# Patient Record
Sex: FEMALE | Race: WHITE | URBAN - METROPOLITAN AREA
[De-identification: names, ages, dates, MRNs, and addresses within clinical notes are randomized per-mention and may not be internally consistent; named-entity substitution may affect disease eponyms.]

---

## 2020-05-18 ENCOUNTER — APPOINTMENT (OUTPATIENT)
Dept: GENERAL RADIOLOGY | Age: 70
End: 2020-05-18
Attending: EMERGENCY MEDICINE
Payer: COMMERCIAL

## 2020-05-18 ENCOUNTER — HOSPITAL ENCOUNTER (EMERGENCY)
Age: 70
Discharge: HOME | End: 2020-05-18
Attending: EMERGENCY MEDICINE
Payer: COMMERCIAL

## 2020-05-18 VITALS
TEMPERATURE: 98.2 F | RESPIRATION RATE: 16 BRPM | WEIGHT: 207.89 LBS | OXYGEN SATURATION: 97 % | HEART RATE: 85 BPM | SYSTOLIC BLOOD PRESSURE: 133 MMHG | DIASTOLIC BLOOD PRESSURE: 57 MMHG

## 2020-05-18 DIAGNOSIS — S43.014A ANTERIOR DISLOCATION OF RIGHT SHOULDER, INITIAL ENCOUNTER: Primary | ICD-10-CM

## 2020-05-18 PROBLEM — S42.91XA TRAUMATIC CLOSED DISPLACED FRACTURE OF RIGHT SHOULDER WITH ANTERIOR DISLOCATION: Status: ACTIVE | Noted: 2020-05-18

## 2020-05-18 PROCEDURE — 74011250636 HC RX REV CODE- 250/636

## 2020-05-18 PROCEDURE — 74011250636 HC RX REV CODE- 250/636: Performed by: EMERGENCY MEDICINE

## 2020-05-18 PROCEDURE — 99152 MOD SED SAME PHYS/QHP 5/>YRS: CPT

## 2020-05-18 PROCEDURE — 96374 THER/PROPH/DIAG INJ IV PUSH: CPT

## 2020-05-18 PROCEDURE — 74011250636 HC RX REV CODE- 250/636: Performed by: PHYSICIAN ASSISTANT

## 2020-05-18 PROCEDURE — 99285 EMERGENCY DEPT VISIT HI MDM: CPT

## 2020-05-18 PROCEDURE — 96375 TX/PRO/DX INJ NEW DRUG ADDON: CPT

## 2020-05-18 PROCEDURE — 73030 X-RAY EXAM OF SHOULDER: CPT

## 2020-05-18 PROCEDURE — 75810000303 HC CLSD TRMT  FRACTURE/DISLOCATION W/  ANES

## 2020-05-18 RX ORDER — AMLODIPINE BESYLATE 10 MG/1
10 TABLET ORAL DAILY
COMMUNITY

## 2020-05-18 RX ORDER — ONDANSETRON 2 MG/ML
4 INJECTION INTRAMUSCULAR; INTRAVENOUS ONCE
Status: COMPLETED | OUTPATIENT
Start: 2020-05-18 | End: 2020-05-18

## 2020-05-18 RX ORDER — IBUPROFEN 600 MG/1
600 TABLET ORAL
Qty: 20 TAB | Refills: 0 | Status: SHIPPED | OUTPATIENT
Start: 2020-05-18

## 2020-05-18 RX ORDER — FENTANYL CITRATE 50 UG/ML
50 INJECTION, SOLUTION INTRAMUSCULAR; INTRAVENOUS
Status: DISCONTINUED | OUTPATIENT
Start: 2020-05-18 | End: 2020-05-18

## 2020-05-18 RX ORDER — MORPHINE SULFATE 2 MG/ML
6 INJECTION, SOLUTION INTRAMUSCULAR; INTRAVENOUS
Status: DISCONTINUED | OUTPATIENT
Start: 2020-05-18 | End: 2020-05-18

## 2020-05-18 RX ORDER — PROPOFOL 10 MG/ML
100 INJECTION, EMULSION INTRAVENOUS
Status: COMPLETED | OUTPATIENT
Start: 2020-05-18 | End: 2020-05-18

## 2020-05-18 RX ORDER — FENTANYL CITRATE 50 UG/ML
INJECTION, SOLUTION INTRAMUSCULAR; INTRAVENOUS
Status: COMPLETED
Start: 2020-05-18 | End: 2020-05-18

## 2020-05-18 RX ORDER — METOPROLOL TARTRATE 25 MG/1
12.5 TABLET, FILM COATED ORAL DAILY
COMMUNITY

## 2020-05-18 RX ORDER — HYDROCODONE BITARTRATE AND ACETAMINOPHEN 5; 325 MG/1; MG/1
1 TABLET ORAL
Qty: 15 TAB | Refills: 0 | Status: SHIPPED | OUTPATIENT
Start: 2020-05-18 | End: 2020-05-23

## 2020-05-18 RX ORDER — LISINOPRIL 40 MG/1
40 TABLET ORAL DAILY
COMMUNITY

## 2020-05-18 RX ORDER — MORPHINE SULFATE 2 MG/ML
4 INJECTION, SOLUTION INTRAMUSCULAR; INTRAVENOUS
Status: COMPLETED | OUTPATIENT
Start: 2020-05-18 | End: 2020-05-18

## 2020-05-18 RX ORDER — FENTANYL CITRATE 50 UG/ML
100 INJECTION, SOLUTION INTRAMUSCULAR; INTRAVENOUS
Status: COMPLETED | OUTPATIENT
Start: 2020-05-18 | End: 2020-05-18

## 2020-05-18 RX ADMIN — ONDANSETRON 4 MG: 2 INJECTION INTRAMUSCULAR; INTRAVENOUS at 16:25

## 2020-05-18 RX ADMIN — FENTANYL CITRATE 100 MCG: 50 INJECTION, SOLUTION INTRAMUSCULAR; INTRAVENOUS at 16:37

## 2020-05-18 RX ADMIN — FENTANYL CITRATE 100 MCG: 50 INJECTION INTRAMUSCULAR; INTRAVENOUS at 16:37

## 2020-05-18 RX ADMIN — PROPOFOL 200 MG: 10 INJECTION, EMULSION INTRAVENOUS at 16:30

## 2020-05-18 RX ADMIN — MORPHINE SULFATE 4 MG: 2 INJECTION, SOLUTION INTRAMUSCULAR; INTRAVENOUS at 16:16

## 2020-05-18 NOTE — CONSULTS
ORTHOPAEDIC CONSULT NOTE    Subjective:     Date of Consultation:  May 18, 2020      Clyde Agosto is a 81 y.o. female who is being seen for right shoulder dislocation. Pt reports GLF 6 days ago. Was seen at [facility] this afternoon and referred to ED for reduction. Pt. Is right hand dominant. Denies prior right shoulder issues. Denies numbness or paraesthesia of the UE     Patient Active Problem List    Diagnosis Date Noted    Traumatic closed displaced fracture of right shoulder with anterior dislocation 05/18/2020     History reviewed. No pertinent family history. Social History     Tobacco Use    Smoking status: Former Smoker    Smokeless tobacco: Never Used   Substance Use Topics    Alcohol use: Yes     Past Medical History:   Diagnosis Date    Hypertension       Past Surgical History:   Procedure Laterality Date    HX HYSTERECTOMY        Prior to Admission medications    Medication Sig Start Date End Date Taking? Authorizing Provider   lisinopriL (PRINIVIL, ZESTRIL) 40 mg tablet Take 40 mg by mouth daily. Yes Other, MD Danie   metoprolol tartrate (LOPRESSOR) 25 mg tablet Take 12.5 mg by mouth daily. Yes Other, MD Danie   amLODIPine (NORVASC) 10 mg tablet Take 10 mg by mouth daily. Yes Other, MD Danie   HYDROcodone-acetaminophen (Norco) 5-325 mg per tablet Take 1 Tab by mouth every six (6) hours as needed for Pain for up to 5 days. Max Daily Amount: 4 Tabs. 5/18/20 5/23/20 Yes Son Tse MD   ibuprofen (MOTRIN) 600 mg tablet Take 1 Tab by mouth every six (6) hours as needed for Pain. 5/18/20  Yes Son Tse MD     No current facility-administered medications for this encounter. Current Outpatient Medications   Medication Sig    lisinopriL (PRINIVIL, ZESTRIL) 40 mg tablet Take 40 mg by mouth daily.  metoprolol tartrate (LOPRESSOR) 25 mg tablet Take 12.5 mg by mouth daily.  amLODIPine (NORVASC) 10 mg tablet Take 10 mg by mouth daily.     HYDROcodone-acetaminophen (Norco) 5-325 mg per tablet Take 1 Tab by mouth every six (6) hours as needed for Pain for up to 5 days. Max Daily Amount: 4 Tabs.  ibuprofen (MOTRIN) 600 mg tablet Take 1 Tab by mouth every six (6) hours as needed for Pain. Allergies   Allergen Reactions    Seafood Anaphylaxis    Iodine Rash    Penicillins Rash        Review of Systems:  A comprehensive review of systems was negative except for that written in the HPI.     Mental Status: no dementia    Objective:     Patient Vitals for the past 8 hrs:   BP Temp Pulse Resp SpO2 Weight   051820 1727 143/59  75 13 97 %    05/18/20 1724 150/59  79 15 97 %    05/18/20 1721 146/65  81 20 91 %    05/18/20 1718 143/67  79 20 96 %    05/18/20 1715 142/62  75 15 95 %    05/18/20 1712 143/61  78 13 96 %    05/18/20 1709 142/47  76 17 97 %    05/18/20 1706 137/57  78 18 97 %    05/18/20 1703 129/58  77 18 97 %    05/18/20 1700 133/58  73 13 98 %    05/18/20 1657 131/56  74 19 99 %    05/18/20 1655 127/54  75 15 98 %    05/18/20 1651 116/51  83 16 97 %    05/18/20 1648 128/41  80 15 97 %    05/18/20 1648 128/41  86 22 96 %    05/18/20 1645 135/52  79 19 95 %    05/18/20 1645 135/52  88 (!) 31 95 %    05/18/20 1640 (!) 116/35  80 20 94 %    05/18/20 1639 (!) 116/35  78 12 95 %    05/18/20 1637 113/47  76 15 96 %    05/18/20 1636 113/47  89 13 (!) 87 %    05/18/20 1633 (!) 116/27  86 17 94 %    05/18/20 1631 142/70  81  100 %    05/18/20 1630 143/67  86 15 99 %    05/18/20 1615 140/62  79 16 99 %    05/18/20 1600 128/61  80 14 99 %    05/18/20 1545 137/60  85 15 99 %    20 1507 126/66 98.1 °F (36.7 °C) 95 18 95 % 94.3 kg (207 lb 14.3 oz)     Temp (24hrs), Av.1 °F (36.7 °C), Min:98.1 °F (36.7 °C), Max:98.1 °F (36.7 °C)      Gen: Well-developed,  in no acute distress   HEENT: Pink conjunctivae, hearing intact to voice, moist mucous membranes   Neck: Supple  Resp: No respiratory distress   Card: RRR, palpable distal pulse-equal bilaterally, birsk cap refill all distal digits   Abd:  non-distended  Musc: RUE with subtle deltoid sulcus, humeral head palpable in the axilla. 5/5 MMT bicep/tri/wrist ext/finger flex/intrinsics bilat  Skin: No skin breakdown noted. Skin warm, pink, dry  Neuro: Cranial nerves are grossly intact, no focal motor weakness, follows commands appropriately  Sensation to light touch throughout the   UE bilat. Psych: Good insight, oriented to person, place and time, alert    Imaging Review: reviewed from OVA via CD    Labs: No results found for this or any previous visit (from the past 24 hour(s)). Impression:     Patient Active Problem List    Diagnosis Date Noted    Traumatic closed displaced fracture of right shoulder with anterior dislocation 05/18/2020     Principal Problem:    Traumatic closed displaced fracture of right shoulder with anterior dislocation (5/18/2020)        Plan:       - Consent for closed reduction, sedation by Dr Bev Vides   - Right shoulder reduction performed via light traction/rotation.   - Sling fitted, NV exam intact pre/post procedure  - Post reduction films confirm successful procedure   - Fu with Dr Sarah Khan call for appt      Dr. Adis Hammonds aware and agrees with plan as above.         Romulo Pettit PA-C  [employer]

## 2020-05-18 NOTE — ED NOTES
Pt arrives to the ED AAOX4 with a c/c of right shoulder/arm pain onset Tuesday. Pt states she fell while in the bathroom and her arm has been dislocated since. She was seen at her Ortho today and was referred to come in to ED for Conscious Sedation and shoulder relocation. Pt is noted in stable condition, now in ED room with side rail up, bed to lowest position and call light within reach. Will continue to monitor and wait for EDP evaluation.

## 2020-05-18 NOTE — DISCHARGE INSTRUCTIONS
You had a dislocation of your right shoulder, without signs of a fracture. We have reduced the dislocation and it is now back in place. Use the sling at all times, and follow-up with Dr. Stephanie Lynn of [facility]. Okay to move hand wrist and elbow. You can use ibuprofen or Norco for pain control.

## 2020-05-18 NOTE — ED PROVIDER NOTES
EMERGENCY DEPARTMENT HISTORY AND PHYSICAL EXAM      Date: 5/18/2020  Patient Name: Andrews Mehta  Patient Age and Sex: 81 y.o. female    History of Presenting Illness     Chief Complaint   Patient presents with    Arm Injury     right arm dislocation, pt sent from [place] [id number].. pt states last tuesday she fell in her bathroom and has been having pain since. History Provided By: Patient    Ability to gather history was limited by:     HPI: Andrews Mehta, 81 y.o. female complains of right shoulder pain, 7 out of 10 severity, constant, throbbing and aching in nature, worsened by any range of motion, for the past 6 days. 6 days ago she had an acute fall at home in her bathroom, unsure of the precise circumstances, thinks she may have slipped, denies syncope. Denies any head injuries. She has had persistent right shoulder pain with limited range of motion since then. No numbness or weakness. Went to orthopedic clinic today where she was found to have shoulder dislocation, sent to emergency department for sedation and reduction. Location:    Quality:      Severity:    Duration:   Timing:      Context:    Modifying factors:   Associated symptoms:       The patient's medical, surgical, family, and social history on file were reviewed by me today. Past Medical History:   Diagnosis Date    Hypertension      Past Surgical History:   Procedure Laterality Date    HX HYSTERECTOMY         PCP: Unknown, Provider    Past History     Past Medical History:  Past Medical History:   Diagnosis Date    Hypertension        Past Surgical History:  Past Surgical History:   Procedure Laterality Date    HX HYSTERECTOMY         Family History:  History reviewed. No pertinent family history. Social History:  Social History     Tobacco Use    Smoking status: Former Smoker    Smokeless tobacco: Never Used   Substance Use Topics    Alcohol use: Yes    Drug use: Not on file       Allergies:   Allergies   Allergen Reactions    Seafood Anaphylaxis    Iodine Rash    Penicillins Rash       Current Medications:  No current facility-administered medications on file prior to encounter. Current Outpatient Medications on File Prior to Encounter   Medication Sig Dispense Refill    lisinopriL (PRINIVIL, ZESTRIL) 40 mg tablet Take 40 mg by mouth daily.  metoprolol tartrate (LOPRESSOR) 25 mg tablet Take 12.5 mg by mouth daily.  amLODIPine (NORVASC) 10 mg tablet Take 10 mg by mouth daily. Review of Systems   Review of Systems   Constitutional: Negative for fever. Neurological: Negative for syncope and headaches. All other systems reviewed and are negative. Physical Exam   Vital Signs  Patient Vitals for the past 8 hrs:   Temp Pulse Resp BP SpO2   05/18/20 1727  75 13 143/59 97 %   05/18/20 1724  79 15 150/59 97 %   05/18/20 1721  81 20 146/65 91 %   05/18/20 1718  79 20 143/67 96 %   05/18/20 1715  75 15 142/62 95 %   05/18/20 1712  78 13 143/61 96 %   05/18/20 1709  76 17 142/47 97 %   05/18/20 1706  78 18 137/57 97 %   05/18/20 1703  77 18 129/58 97 %   05/18/20 1700  73 13 133/58 98 %   05/18/20 1657  74 19 131/56 99 %   05/18/20 1655  75 15 127/54 98 %   05/18/20 1651  83 16 116/51 97 %   05/18/20 1648  80 15 128/41 97 %   05/18/20 1648  86 22 128/41 96 %   05/18/20 1645  79 19 135/52 95 %   05/18/20 1645  88 (!) 31 135/52 95 %   05/18/20 1640  80 20 (!) 116/35 94 %   05/18/20 1639  78 12 (!) 116/35 95 %   05/18/20 1637  76 15 113/47 96 %   05/18/20 1636  89 13 113/47 (!) 87 %   05/18/20 1633  86 17 (!) 116/27 94 %   05/18/20 1631  81  142/70 100 %   05/18/20 1630  86 15 143/67 99 %   05/18/20 1615  79 16 140/62 99 %   05/18/20 1600  80 14 128/61 99 %   05/18/20 1545  85 15 137/60 99 %   05/18/20 1507 98.1 °F (36.7 °C) 95 18 126/66 95 %          Physical Exam  Vitals signs and nursing note reviewed. Constitutional:       General: She is not in acute distress. Appearance: Normal appearance. She is not ill-appearing. HENT:      Head: Normocephalic and atraumatic. Musculoskeletal:      Right shoulder: She exhibits decreased range of motion, tenderness and deformity. Left shoulder: Normal.      Right elbow: Normal.     Left elbow: Normal.      Right wrist: Normal.      Left wrist: Normal.   Neurological:      Mental Status: She is alert. Diagnostic Study Results   Labs  No results found for this or any previous visit (from the past 24 hour(s)).     Radiologic Studies  XR SHOULDER RT AP/LAT MIN 2 V    (Results Pending)     CT Results  (Last 48 hours)    None        CXR Results  (Last 48 hours)    None          Procedures   Procedural Sedation  Date/Time: 5/18/2020 5:55 PM  Performed by: Son Tse MD  Authorized by: Son Tse MD     Consent:     Consent obtained:  Written    Consent given by:  Patient    Risks discussed:  Prolonged hypoxia resulting in organ damage, inadequate sedation, nausea, vomiting and respiratory compromise necessitating ventilatory assistance and intubation  Indications:     Procedure performed:  Dislocation reduction    Procedure necessitating sedation performed by:  Physician performing sedation    Intended level of sedation:  Moderate (conscious sedation)  Pre-sedation assessment:     ASA classification: class 1 - normal, healthy patient      Neck mobility: normal      Mallampati score:  II - soft palate, uvula, fauces visible    History of difficult intubation: no      Pre-sedation assessment completed:  5/18/2020 4:25 PM  Immediate pre-procedure details:     Reassessment: Patient reassessed immediately prior to procedure      Reviewed: vital signs and NPO status    Procedure details (see MAR for exact dosages):     Sedation start time:  5/18/2020 4:30 PM    Preoxygenation:  Nasal cannula    Sedation:  Propofol    Analgesia:  Fentanyl and morphine    Intra-procedure monitoring:  Blood pressure monitoring, cardiac monitor, continuous capnometry and continuous pulse oximetry    Intra-procedure events: none      Sedation end time:  5/18/2020 4:45 PM    Total sedation time (minutes):  15  Post-procedure details:     Attendance: Constant attendance by certified staff until patient recovered      Recovery: Patient returned to pre-procedure baseline      Post-sedation assessments completed and reviewed: airway patency, cardiovascular function, mental status and nausea/vomiting      Specimens recovered:  None    Patient is stable for discharge or admission: yes      Patient tolerance: Tolerated well, no immediate complications  Reduction of Joint  Date/Time: 5/18/2020 5:57 PM  Performed by: Gerson Trinidad MD  Authorized by: Gerson Trinidad MD     Consent:     Consent obtained:  Written    Consent given by:  Patient    Risks discussed:  Nerve damage and pain  Injury:     Injury location:  Shoulder    Shoulder injury location:  R shoulder    Hill-Sachs deformity: no    Pre-procedure assessment:     Neurological function: normal      Distal perfusion: normal      Range of motion: reduced    Anesthesia (see MAR for exact dosages): Anesthesia method:  None  Procedure details:     Manipulation performed: yes      Reduction successful: yes      X-ray confirmed reduction: yes      Immobilization:  Sling  Post-procedure assessment:     Neurological function: normal      Distal perfusion: normal      Range of motion: improved      Patient tolerance of procedure: Tolerated well, no immediate complications        Medical Decision Making     I reviewed the patient's most recent Emergency Dept notes and diagnostic tests  in formulating my MDM on today's visit. Provider Notes (Medical Decision Making):   24-year-old female with fall nearly 1 week ago, with persistent right shoulder pain and decreased range of motion.   Found to have dislocation of the right shoulder, without fracture, at outside orthopedic clinic and sent to the emergency department for reduction. X-rays were reviewed by the orthopedic PA. Patient has consented for sedation and reduction. She last ate about 6 hours ago. She was given morphine for pain, propofol for sedation. She has no other apparent significant injuries, no indication for further x-rays or CT imaging today. Vinicio Briggs MD  4:10 PM    I performed procedural sedation and dislocation reduction. No complications. Placed in sling. I reevaluated her after sling placement, normal NV exam, WWP. D/C home, f/u Ortho. Rx [place] and ibuprofen. Delvis Pérze MD  5:58 PM        Social History     Tobacco Use    Smoking status: Former Smoker    Smokeless tobacco: Never Used   Substance Use Topics    Alcohol use:  Yes    Drug use: Not on file     Patient Vitals for the past 4 hrs:   Temp Pulse Resp BP SpO2   05/18/20 1727  75 13 143/59 97 %   05/18/20 1724  79 15 150/59 97 %   05/18/20 1721  81 20 146/65 91 %   05/18/20 1718  79 20 143/67 96 %   05/18/20 1715  75 15 142/62 95 %   05/18/20 1712  78 13 143/61 96 %   05/18/20 1709  76 17 142/47 97 %   05/18/20 1706  78 18 137/57 97 %   05/18/20 1703  77 18 129/58 97 %   05/18/20 1700  73 13 133/58 98 %   05/18/20 1657  74 19 131/56 99 %   05/18/20 1655  75 15 127/54 98 %   05/18/20 1651  83 16 116/51 97 %   05/18/20 1648  80 15 128/41 97 %   05/18/20 1648  86 22 128/41 96 %   05/18/20 1645  79 19 135/52 95 %   05/18/20 1645  88 (!) 31 135/52 95 %   05/18/20 1640  80 20 (!) 116/35 94 %   05/18/20 1639  78 12 (!) 116/35 95 %   05/18/20 1637  76 15 113/47 96 %   05/18/20 1636  89 13 113/47 (!) 87 %   05/18/20 1633  86 17 (!) 116/27 94 %   05/18/20 1631  81  142/70 100 %   05/18/20 1630  86 15 143/67 99 %   05/18/20 1615  79 16 140/62 99 %   05/18/20 1600  80 14 128/61 99 %   05/18/20 1545  85 15 137/60 99 %   05/18/20 1507 98.1 °F (36.7 °C) 95 18 126/66 95 %            Consults: Orthopedics      Medications Administered during ED course:  Medications   propofoL (DIPRIVAN) 10 mg/mL injection 100 mg (200 mg IntraVENous Given by Provider 5/18/20 1630)   morphine injection 4 mg (4 mg IntraVENous Given 5/18/20 1616)   ondansetron (ZOFRAN) injection 4 mg (4 mg IntraVENous Given 5/18/20 1625)   fentaNYL citrate (PF) injection 100 mcg (100 mcg IntraVENous Given 5/18/20 1637)          Current Discharge Medication List      START taking these medications    Details   HYDROcodone-acetaminophen (Norco) 5-325 mg per tablet Take 1 Tab by mouth every six (6) hours as needed for Pain for up to 5 days. Max Daily Amount: 4 Tabs. Qty: 15 Tab, Refills: 0    Associated Diagnoses: Anterior dislocation of right shoulder, initial encounter      ibuprofen (MOTRIN) 600 mg tablet Take 1 Tab by mouth every six (6) hours as needed for Pain. Qty: 20 Tab, Refills: 0                Diagnosis and Disposition     Disposition:  Discharged    Clinical Impression:   1. Anterior dislocation of right shoulder, initial encounter        Attestation:  I personally performed the services described in this documentation on this date 5/18/2020 for patient Oliva Mays. Alexandrea Bhakta MD        I was the first provider for this patient on this visit. To the best of my ability I reviewed relevant prior medical records, electrocardiograms, laboratories, and radiologic studies. The patient's presenting problems were discussed, and the patient was in agreement with the care plan formulated and outlined with them. Alexandrea Bhakta MD    Please note that this dictation was completed with Dragon voice recognition software. Quite often unanticipated grammatical, syntax, homophones, and other interpretive errors are inadvertently transcribed by the computer software. Please disregard these errors and excuse any errors that have escaped final proofreading.

## 2020-06-19 ENCOUNTER — HOSPITAL ENCOUNTER (OUTPATIENT)
Dept: MRI IMAGING | Age: 70
Discharge: HOME | End: 2020-06-19
Attending: ORTHOPAEDIC SURGERY
Payer: COMMERCIAL

## 2020-06-19 DIAGNOSIS — M25.511 ACUTE PAIN OF RIGHT SHOULDER: ICD-10-CM

## 2020-06-19 DIAGNOSIS — S43.004A DISLOCATION OF RIGHT SHOULDER JOINT, INITIAL ENCOUNTER: ICD-10-CM

## 2020-06-19 PROCEDURE — 73221 MRI JOINT UPR EXTREM W/O DYE: CPT

## 2022-03-19 PROBLEM — S42.91XA TRAUMATIC CLOSED DISPLACED FRACTURE OF RIGHT SHOULDER WITH ANTERIOR DISLOCATION: Status: ACTIVE | Noted: 2020-05-18

## 2023-05-18 RX ORDER — LISINOPRIL 40 MG/1
40 TABLET ORAL DAILY
COMMUNITY

## 2023-05-18 RX ORDER — IBUPROFEN 600 MG/1
600 TABLET ORAL EVERY 6 HOURS PRN
COMMUNITY
Start: 2020-05-18

## 2023-05-18 RX ORDER — AMLODIPINE BESYLATE 10 MG/1
10 TABLET ORAL DAILY
COMMUNITY

## 2023-09-29 ENCOUNTER — HOSPITAL ENCOUNTER (EMERGENCY)
Facility: HOSPITAL | Age: 73
Discharge: HOME | End: 2023-09-30
Attending: EMERGENCY MEDICINE
Payer: COMMERCIAL

## 2023-09-29 ENCOUNTER — APPOINTMENT (OUTPATIENT)
Facility: HOSPITAL | Age: 73
End: 2023-09-29
Payer: COMMERCIAL

## 2023-09-29 DIAGNOSIS — S43.005A SHOULDER DISLOCATION, LEFT, INITIAL ENCOUNTER: Primary | ICD-10-CM

## 2023-09-29 PROCEDURE — 96375 TX/PRO/DX INJ NEW DRUG ADDON: CPT

## 2023-09-29 PROCEDURE — 6360000002 HC RX W HCPCS: Performed by: EMERGENCY MEDICINE

## 2023-09-29 PROCEDURE — 96361 HYDRATE IV INFUSION ADD-ON: CPT

## 2023-09-29 PROCEDURE — 99284 EMERGENCY DEPT VISIT MOD MDM: CPT | Performed by: PHYSICIAN ASSISTANT

## 2023-09-29 PROCEDURE — 96376 TX/PRO/DX INJ SAME DRUG ADON: CPT

## 2023-09-29 PROCEDURE — 96374 THER/PROPH/DIAG INJ IV PUSH: CPT

## 2023-09-29 PROCEDURE — 23650 CLTX SHO DSLC W/MNPJ WO ANES: CPT

## 2023-09-29 PROCEDURE — 6360000002 HC RX W HCPCS: Performed by: PHYSICIAN ASSISTANT

## 2023-09-29 PROCEDURE — 73200 CT UPPER EXTREMITY W/O DYE: CPT

## 2023-09-29 PROCEDURE — 6360000002 HC RX W HCPCS

## 2023-09-29 PROCEDURE — 2580000003 HC RX 258: Performed by: PHYSICIAN ASSISTANT

## 2023-09-29 PROCEDURE — 6370000000 HC RX 637 (ALT 250 FOR IP): Performed by: PHYSICIAN ASSISTANT

## 2023-09-29 PROCEDURE — 73060 X-RAY EXAM OF HUMERUS: CPT

## 2023-09-29 PROCEDURE — 73030 X-RAY EXAM OF SHOULDER: CPT

## 2023-09-29 PROCEDURE — 99285 EMERGENCY DEPT VISIT HI MDM: CPT

## 2023-09-29 PROCEDURE — 2500000003 HC RX 250 WO HCPCS: Performed by: EMERGENCY MEDICINE

## 2023-09-29 RX ORDER — 0.9 % SODIUM CHLORIDE 0.9 %
1000 INTRAVENOUS SOLUTION INTRAVENOUS ONCE
Status: COMPLETED | OUTPATIENT
Start: 2023-09-29 | End: 2023-09-29

## 2023-09-29 RX ORDER — KETAMINE HYDROCHLORIDE 10 MG/ML
100 INJECTION INTRAMUSCULAR; INTRAVENOUS
Status: COMPLETED | OUTPATIENT
Start: 2023-09-29 | End: 2023-09-29

## 2023-09-29 RX ORDER — MORPHINE SULFATE 2 MG/ML
INJECTION, SOLUTION INTRAMUSCULAR; INTRAVENOUS
Status: COMPLETED
Start: 2023-09-29 | End: 2023-09-29

## 2023-09-29 RX ORDER — KETAMINE HYDROCHLORIDE 10 MG/ML
100 INJECTION INTRAMUSCULAR; INTRAVENOUS ONCE
Status: COMPLETED | OUTPATIENT
Start: 2023-09-29 | End: 2023-09-29

## 2023-09-29 RX ORDER — MORPHINE SULFATE 2 MG/ML
2 INJECTION, SOLUTION INTRAMUSCULAR; INTRAVENOUS
Status: COMPLETED | OUTPATIENT
Start: 2023-09-29 | End: 2023-09-29

## 2023-09-29 RX ORDER — ONDANSETRON 2 MG/ML
4 INJECTION INTRAMUSCULAR; INTRAVENOUS ONCE
Status: COMPLETED | OUTPATIENT
Start: 2023-09-29 | End: 2023-09-29

## 2023-09-29 RX ORDER — HYDROCODONE BITARTRATE AND ACETAMINOPHEN 5; 325 MG/1; MG/1
1 TABLET ORAL
Status: COMPLETED | OUTPATIENT
Start: 2023-09-29 | End: 2023-09-29

## 2023-09-29 RX ORDER — KETOROLAC TROMETHAMINE 30 MG/ML
15 INJECTION, SOLUTION INTRAMUSCULAR; INTRAVENOUS EVERY 6 HOURS PRN
Status: DISCONTINUED | OUTPATIENT
Start: 2023-09-29 | End: 2023-09-30 | Stop reason: HOSPADM

## 2023-09-29 RX ORDER — HYDROCODONE BITARTRATE AND ACETAMINOPHEN 5; 325 MG/1; MG/1
1 TABLET ORAL EVERY 6 HOURS PRN
Qty: 12 TABLET | Refills: 0 | Status: SHIPPED | OUTPATIENT
Start: 2023-09-29 | End: 2023-10-02

## 2023-09-29 RX ADMIN — ONDANSETRON 4 MG: 2 INJECTION INTRAMUSCULAR; INTRAVENOUS at 17:10

## 2023-09-29 RX ADMIN — KETAMINE HYDROCHLORIDE 100 MG: 10 INJECTION INTRAMUSCULAR; INTRAVENOUS at 19:56

## 2023-09-29 RX ADMIN — PROPOFOL 100 MG: 10 INJECTION, EMULSION INTRAVENOUS at 19:56

## 2023-09-29 RX ADMIN — MORPHINE SULFATE 2 MG: 2 INJECTION, SOLUTION INTRAMUSCULAR; INTRAVENOUS at 17:10

## 2023-09-29 RX ADMIN — KETAMINE HYDROCHLORIDE 100 MG: 10 INJECTION, SOLUTION INTRAMUSCULAR; INTRAVENOUS at 21:05

## 2023-09-29 RX ADMIN — HYDROCODONE BITARTRATE AND ACETAMINOPHEN 1 TABLET: 5; 325 TABLET ORAL at 21:48

## 2023-09-29 RX ADMIN — PROPOFOL 100 MG: 10 INJECTION, EMULSION INTRAVENOUS at 21:05

## 2023-09-29 RX ADMIN — KETOROLAC TROMETHAMINE 15 MG: 30 INJECTION, SOLUTION INTRAMUSCULAR; INTRAVENOUS at 22:18

## 2023-09-29 RX ADMIN — MORPHINE SULFATE 2 MG: 2 INJECTION, SOLUTION INTRAMUSCULAR; INTRAVENOUS at 22:19

## 2023-09-29 RX ADMIN — SODIUM CHLORIDE 1000 ML: 9 INJECTION, SOLUTION INTRAVENOUS at 18:09

## 2023-09-29 ASSESSMENT — PAIN DESCRIPTION - ORIENTATION: ORIENTATION: LEFT

## 2023-09-29 ASSESSMENT — PAIN SCALES - GENERAL
PAINLEVEL_OUTOF10: 10
PAINLEVEL_OUTOF10: 10

## 2023-09-29 ASSESSMENT — PAIN DESCRIPTION - LOCATION: LOCATION: SHOULDER

## 2023-09-29 ASSESSMENT — PAIN - FUNCTIONAL ASSESSMENT: PAIN_FUNCTIONAL_ASSESSMENT: 0-10

## 2023-09-29 NOTE — ED PROVIDER NOTES
[facility] EMERGENCY DEPT  EMERGENCY DEPARTMENT ENCOUNTER       Pt Name: Lyssa Martinez  MRN: [id number]  [address] [facility] 1950  Date of evaluation: 9/29/2023  Provider: Ana Laura Sandhu PA-C  PCP: No primary care provider on file. Note Started: 4:34 PM EDT 9/29/23     CHIEF COMPLAINT       Chief Complaint   Patient presents with    Dislocation     Pt ambulates to the ED via triage for c/o left shoulder dislocation due to a fall. Pt reports hitting her head and LOC. HISTORY OF PRESENT ILLNESS: 1 or more elements      History From: Patient  HPI Limitations: None     Lyssa Martinez is a 72 y.o. female with past medical history of hypertension and asthma, who presents left shoulder dislocation. Patient states she tripped over her cat and fell 4 days ago injuring her left shoulder. She went to orthopedic [facility] clinic today, had an x-ray that showed a dislocation and was sent to ER for further evaluation/reduction which was not attempted in their clinic. Patient states she hit her head and may have lost consciousness at that time but denies any persistent headache or mental status changes. The week prior, she fell outside and injured her left elbow and lower humerus which resulted in a lot of pain and bruising but she states that they x-rayed that at the Ortho clinic today and was told there was no abnormality. Nursing Notes were all reviewed and agreed with or any disagreements were addressed in the HPI. REVIEW OF SYSTEMS      Review of Systems     Positives and Pertinent negatives as per HPI. PAST HISTORY     Past Medical History:  Past Medical History:   Diagnosis Date    Hypertension        Past Surgical History:  Past Surgical History:   Procedure Laterality Date    HYSTERECTOMY         Family History:  No family history on file. Social History:  Social History     Tobacco Use    Smoking status: Former    Smokeless tobacco: Never   Substance Use Topics    Alcohol use:  Yes appropriate. DIAGNOSTIC RESULTS   LABS:     No results found for this or any previous visit (from the past 24 hour(s)). RADIOLOGY:  Non-plain film images such as CT, Ultrasound and MRI are read by the radiologist. Plain radiographic images are visualized and preliminarily interpreted by the ED Provider with the below findings:          XR SHOULDER LEFT (MIN 2 VIEWS)    Result Date: 9/29/2023  EXAM: XR SHOULDER LEFT (MIN 2 VIEWS) INDICATION: procedure. Pain COMPARISON: None. FINDINGS: Three views of the left shoulder demonstrate significant improvement in alignment. Improvement in alignment. XR HUMERUS LEFT (MIN 2 VIEWS)    Result Date: 9/29/2023  EXAM: XR HUMERUS LEFT (MIN 2 VIEWS) INDICATION: left arm pain, probable dislocation from fall 4 days ago. COMPARISON: None. FINDINGS: 3 views of the left humerus demonstrate anterior dislocation of the humeral head with respect to the glenoid. No acute fracture deformity is identified. No foreign body is seen within the soft tissues. Dislocation of the LEFT humeral head anterior and inferior with respect to the glenoid. PROCEDURES   Unless otherwise noted below, none  Ortho Injury    Date/Time: 9/29/2023 7:30 PM    Performed by: RANDY Solorio  Authorized by: Jeremy Romero MD  Consent: Verbal consent obtained. Written consent obtained.   Risks and benefits: risks, benefits and alternatives were discussed  Consent given by: patient  Patient understanding: patient states understanding of the procedure being performed  Patient consent: the patient's understanding of the procedure matches consent given  Procedure consent: procedure consent matches procedure scheduled  Relevant documents: relevant documents present and verified  Test results: test results available and properly labeled  Site marked: the operative site was marked  Imaging studies: imaging studies available  Required items: required blood products, implants, devices, and

## 2023-09-29 NOTE — ED NOTES
Bedside and Verbal shift change report given to Gwendolyn Marie RN (oncoming nurse) by Marlene Bauer RN (offgoing nurse). Report included the following information Nurse Handoff Report, Index, ED Encounter Summary, ED SBAR, Adult Overview, MAR, Recent Results, Med Rec Status, and Neuro Assessment.        Donna Powers RN  09/29/23 [id number]

## 2023-09-30 VITALS
TEMPERATURE: 98.1 F | OXYGEN SATURATION: 94 % | DIASTOLIC BLOOD PRESSURE: 89 MMHG | BODY MASS INDEX: 27.53 KG/M2 | SYSTOLIC BLOOD PRESSURE: 103 MMHG | WEIGHT: 203.26 LBS | RESPIRATION RATE: 14 BRPM | HEIGHT: 72 IN | HEART RATE: 95 BPM

## 2023-09-30 NOTE — SEDATION DOCUMENTATION
Medications administered by Víctor Weiner MD at this time:    Propofol 23mg (2.3mL)  Ketamine 23mg (2.3mL)    Doses are calculated using weight-based dosing by Víctor Weiner MD, confirmed by Mike Patel RN.

## 2023-09-30 NOTE — SEDATION DOCUMENTATION
Procedure begun at this time. Medications administered by Fabi De La Rosa MD at this time:    Propofol 23mg (2.3mL)  Ketamine 23mg (2.3mL)    Doses are calculated using weight-based dosing by Fabi De La Rosa MD, confirmed by Migdalia Leslie RN. Patient placed on 2L NC at this time.

## 2023-09-30 NOTE — CONSULTS
ORTHOPAEDIC CONSULT NOTE    Subjective:     Date of Consultation:  September 29, 2023      Brenda Munguia is a 72 y.o. female who is being seen for left shoulder dislocation x 4 days. Pt reports injury occurred when she tripped over her cat . Ambulates at baseline unassisted. Pt. Is right hand dominant. Initial closed reduction attempt by ED providers, unsuccessful. Pt's  at bedside  Pt suffered right shoulder dislocation 2020    Patient Active Problem List    Diagnosis Date Noted    Traumatic closed displaced fracture of right shoulder with anterior dislocation 05/18/2020     No family history on file. Social History     Tobacco Use    Smoking status: Former    Smokeless tobacco: Never   Substance Use Topics    Alcohol use: Yes     Past Medical History:   Diagnosis Date    Hypertension       Past Surgical History:   Procedure Laterality Date    HYSTERECTOMY        Prior to Admission medications    Medication Sig Start Date End Date Taking?  Authorizing Provider   amLODIPine (NORVASC) 10 MG tablet Take 1 tablet by mouth daily    Ar Automatic Reconciliation   ibuprofen (ADVIL;MOTRIN) 600 MG tablet Take 1 tablet by mouth every 6 hours as needed 5/18/20   Ar Automatic Reconciliation   lisinopril (PRINIVIL;ZESTRIL) 40 MG tablet Take 1 tablet by mouth daily    Ar Automatic Reconciliation   metoprolol tartrate (LOPRESSOR) 25 MG tablet Take 0.5 tablets by mouth daily    Ar Automatic Reconciliation     Current Facility-Administered Medications   Medication Dose Route Frequency    propofol bolus 100 mg  100 mg IntraVENous Once    ketamine (KETALAR) injection 100 mg  100 mg IntraVENous Once     Current Outpatient Medications   Medication Sig    amLODIPine (NORVASC) 10 MG tablet Take 1 tablet by mouth daily    ibuprofen (ADVIL;MOTRIN) 600 MG tablet Take 1 tablet by mouth every 6 hours as needed    lisinopril (PRINIVIL;ZESTRIL) 40 MG tablet Take 1 tablet by mouth daily    metoprolol tartrate (LOPRESSOR) 25 for closed reduction  Sedation by Dr Dede Shields   Proceeded with reduction using gentle traction, manual manipulation. The humeral head was grossly unstable and immediately dislocated with gentle internal/external rotation. Sling was fitted. Patient tolerated the procedure well. Neurovascular exam intact post procedure, Splint precautions reviewed. Post reduction films confirm improved alignment, although anterior subluxation is likely. Impression:     Patient Active Problem List    Diagnosis Date Noted    Traumatic closed displaced fracture of right shoulder with anterior dislocation 05/18/2020     Active Problems:    * No active hospital problems. *  Resolved Problems:    * No resolved hospital problems. *      Plan:     CT scan left shoulder ordered  Pain meds per ED team.    Keep sling on at all times  Office FU with Dr. Jocelynn Alba Mon/Tue of next week-- by calling for an appointment     Dr. Jocelynn Alba  aware and agrees with above plan.       [employer]

## 2023-09-30 NOTE — SEDATION DOCUMENTATION
Medications administered by Frantz Hoang MD at this time:    Propofol 23mg (2.3mL)  Ketamine 23mg (2.3mL)    Doses are calculated using weight-based dosing by Frantz Hoang MD, confirmed by Kelechi Galo RN.

## 2023-09-30 NOTE — ED NOTES
Michele FUNG reviewed discharge instructions with the patient. The patient verbalized understanding. All questions and concerns were addressed. The patient is discharged Leah Mueller RN with instructions and prescriptions in hand. Pt is alert and oriented x 4. Respirations are clear and unlabored.        Sangita Malik RN  09/30/23 [id number]

## 2023-09-30 NOTE — SEDATION DOCUMENTATION
Medications administered by Anthony Dewitt MD at this time:    Propofol 46.1mg (4.61mL)  Ketamine 46.1mg (4.61mL)    Doses are calculated using weight-based dosing by Anthony Dewitt MD, confirmed by Colletta Spurr RN.

## 2023-09-30 NOTE — SEDATION DOCUMENTATION
Time-out performed by Marleen Neves MD with Rey PADILLA at this time. Medications administered by Marleen Neves MD at this time:    Propofol 46.1mg (4.61mL)  Ketamine 46.1mg (4.61mL)    Doses are calculated using weight-based dosing by Marleen Neves MD, confirmed by Wojciech Jordan RN.